# Patient Record
Sex: FEMALE | Race: WHITE | Employment: PART TIME | URBAN - NONMETROPOLITAN AREA
[De-identification: names, ages, dates, MRNs, and addresses within clinical notes are randomized per-mention and may not be internally consistent; named-entity substitution may affect disease eponyms.]

---

## 2018-02-26 ENCOUNTER — HOSPITAL ENCOUNTER (OUTPATIENT)
Age: 58
Discharge: HOME | End: 2018-02-26
Payer: COMMERCIAL

## 2018-02-26 DIAGNOSIS — E03.9 HYPOTHYROIDISM, UNSPECIFIED TYPE: ICD-10-CM

## 2018-02-26 LAB
T3 FREE: 2.52 PG/ML (ref 2.02–4.43)
THYROXINE, FREE: 1.23 NG/DL (ref 0.93–1.7)
TSH SERPL DL<=0.05 MIU/L-ACNC: 7.89 MIU/L (ref 0.3–5)

## 2018-02-26 PROCEDURE — 84443 ASSAY THYROID STIM HORMONE: CPT

## 2018-02-26 PROCEDURE — 84481 FREE ASSAY (FT-3): CPT

## 2018-02-26 PROCEDURE — 84439 ASSAY OF FREE THYROXINE: CPT

## 2018-02-26 PROCEDURE — 36415 COLL VENOUS BLD VENIPUNCTURE: CPT

## 2018-04-11 ENCOUNTER — HOSPITAL ENCOUNTER (OUTPATIENT)
Age: 58
Discharge: HOME | End: 2018-04-11
Payer: COMMERCIAL

## 2018-04-11 DIAGNOSIS — E03.9 HYPOTHYROIDISM, UNSPECIFIED TYPE: Chronic | ICD-10-CM

## 2018-04-11 LAB
T3 FREE: 3.37 PG/ML (ref 2.02–4.43)
THYROXINE, FREE: 1.57 NG/DL (ref 0.93–1.7)
TSH SERPL DL<=0.05 MIU/L-ACNC: 0.46 MIU/L (ref 0.3–5)

## 2018-04-11 PROCEDURE — 84443 ASSAY THYROID STIM HORMONE: CPT

## 2018-04-11 PROCEDURE — 84481 FREE ASSAY (FT-3): CPT

## 2018-04-11 PROCEDURE — 36415 COLL VENOUS BLD VENIPUNCTURE: CPT

## 2018-04-11 PROCEDURE — 84439 ASSAY OF FREE THYROXINE: CPT

## 2018-10-16 ENCOUNTER — HOSPITAL ENCOUNTER (OUTPATIENT)
Age: 58
Discharge: HOME | End: 2018-10-18
Payer: COMMERCIAL

## 2018-10-16 ENCOUNTER — HOSPITAL ENCOUNTER (OUTPATIENT)
Dept: GENERAL RADIOLOGY | Age: 58
Discharge: HOME | End: 2018-10-18
Payer: COMMERCIAL

## 2018-10-16 DIAGNOSIS — R07.81 RIB PAIN ON RIGHT SIDE: ICD-10-CM

## 2018-10-16 PROCEDURE — 71100 X-RAY EXAM RIBS UNI 2 VIEWS: CPT

## 2018-10-24 ENCOUNTER — HOSPITAL ENCOUNTER (OUTPATIENT)
Dept: CT IMAGING | Age: 58
Discharge: HOME | End: 2018-10-26
Payer: COMMERCIAL

## 2018-10-24 DIAGNOSIS — R07.81 RIB PAIN ON RIGHT SIDE: ICD-10-CM

## 2018-10-24 PROCEDURE — 71250 CT THORAX DX C-: CPT

## 2019-10-23 ENCOUNTER — TELEPHONE (OUTPATIENT)
Dept: PAIN MANAGEMENT | Age: 59
End: 2019-10-23

## 2019-11-19 ENCOUNTER — HOSPITAL ENCOUNTER (OUTPATIENT)
Dept: PAIN MANAGEMENT | Age: 59
Discharge: HOME | End: 2019-11-19

## 2019-11-19 VITALS
SYSTOLIC BLOOD PRESSURE: 154 MMHG | BODY MASS INDEX: 38.08 KG/M2 | DIASTOLIC BLOOD PRESSURE: 84 MMHG | WEIGHT: 214.9 LBS | HEIGHT: 63 IN | TEMPERATURE: 97.9 F | HEART RATE: 86 BPM | RESPIRATION RATE: 18 BRPM

## 2019-11-19 DIAGNOSIS — R07.81 RIB PAIN ON RIGHT SIDE: Chronic | ICD-10-CM

## 2019-11-19 DIAGNOSIS — R05.9 PAIN AGGRAVATED BY COUGHING AND DEEP BREATHING: Chronic | ICD-10-CM

## 2019-11-19 DIAGNOSIS — R52 PAIN AGGRAVATED BY COUGHING AND DEEP BREATHING: Chronic | ICD-10-CM

## 2019-11-19 PROCEDURE — 97813 ACUP 1/> W/ESTIM 1ST 15 MIN: CPT | Performed by: ANESTHESIOLOGY

## 2019-11-19 PROCEDURE — 99203 OFFICE O/P NEW LOW 30 MIN: CPT | Performed by: ANESTHESIOLOGY

## 2019-11-19 PROCEDURE — 99201 HC NEW PT, E/M LEVEL 1: CPT

## 2019-11-19 ASSESSMENT — PAIN DESCRIPTION - PAIN TYPE: TYPE: CHRONIC PAIN

## 2019-11-19 ASSESSMENT — PAIN DESCRIPTION - LOCATION: LOCATION: ABDOMEN;RIB CAGE

## 2019-11-19 ASSESSMENT — PAIN DESCRIPTION - PROGRESSION: CLINICAL_PROGRESSION: NOT CHANGED

## 2019-11-19 ASSESSMENT — PAIN DESCRIPTION - ONSET: ONSET: ON-GOING

## 2019-11-19 ASSESSMENT — PAIN DESCRIPTION - ORIENTATION: ORIENTATION: RIGHT

## 2019-11-19 ASSESSMENT — PAIN SCALES - GENERAL: PAINLEVEL_OUTOF10: 2

## 2019-11-19 ASSESSMENT — PAIN - FUNCTIONAL ASSESSMENT: PAIN_FUNCTIONAL_ASSESSMENT: PREVENTS OR INTERFERES SOME ACTIVE ACTIVITIES AND ADLS

## 2019-11-19 ASSESSMENT — PAIN DESCRIPTION - FREQUENCY: FREQUENCY: INTERMITTENT

## 2019-11-19 ASSESSMENT — PAIN DESCRIPTION - DESCRIPTORS: DESCRIPTORS: SPASM;SQUEEZING

## 2019-11-26 ENCOUNTER — HOSPITAL ENCOUNTER (OUTPATIENT)
Dept: PAIN MANAGEMENT | Age: 59
Discharge: HOME | End: 2019-11-26

## 2019-11-26 VITALS
BODY MASS INDEX: 37.32 KG/M2 | DIASTOLIC BLOOD PRESSURE: 83 MMHG | TEMPERATURE: 97.3 F | HEIGHT: 64 IN | RESPIRATION RATE: 20 BRPM | SYSTOLIC BLOOD PRESSURE: 131 MMHG | WEIGHT: 218.6 LBS | HEART RATE: 81 BPM

## 2019-11-26 PROCEDURE — 99212 OFFICE O/P EST SF 10 MIN: CPT | Performed by: ANESTHESIOLOGY

## 2019-11-26 PROCEDURE — 99211 OFF/OP EST MAY X REQ PHY/QHP: CPT

## 2019-11-26 SDOH — HEALTH STABILITY: MENTAL HEALTH: HOW OFTEN DO YOU HAVE A DRINK CONTAINING ALCOHOL?: NEVER

## 2019-11-26 ASSESSMENT — PAIN - FUNCTIONAL ASSESSMENT: PAIN_FUNCTIONAL_ASSESSMENT: ACTIVITIES ARE NOT PREVENTED

## 2019-11-26 ASSESSMENT — PAIN DESCRIPTION - PROGRESSION: CLINICAL_PROGRESSION: GRADUALLY IMPROVING

## 2019-11-26 ASSESSMENT — PAIN DESCRIPTION - ORIENTATION: ORIENTATION: RIGHT

## 2019-11-26 ASSESSMENT — PAIN SCALES - GENERAL: PAINLEVEL_OUTOF10: 0

## 2019-11-26 ASSESSMENT — PAIN DESCRIPTION - PAIN TYPE: TYPE: CHRONIC PAIN

## 2019-11-26 ASSESSMENT — PAIN DESCRIPTION - LOCATION: LOCATION: RIB CAGE

## 2019-11-26 ASSESSMENT — PAIN DESCRIPTION - ONSET: ONSET: PROGRESSIVE

## 2019-11-26 ASSESSMENT — PAIN DESCRIPTION - FREQUENCY: FREQUENCY: INTERMITTENT

## 2020-11-05 ENCOUNTER — HOSPITAL ENCOUNTER (OUTPATIENT)
Age: 60
Discharge: HOME | End: 2020-11-05
Payer: COMMERCIAL

## 2020-11-05 LAB
ALT SERPL-CCNC: 15 U/L (ref 5–33)
ANION GAP SERPL CALCULATED.3IONS-SCNC: 12 MMOL/L (ref 9–17)
AST SERPL-CCNC: 13 U/L
BUN BLDV-MCNC: 19 MG/DL (ref 6–20)
BUN/CREAT BLD: 22 (ref 9–20)
CALCIUM SERPL-MCNC: 9.5 MG/DL (ref 8.6–10.4)
CHLORIDE BLD-SCNC: 102 MMOL/L (ref 98–107)
CHOLESTEROL/HDL RATIO: 4.7
CHOLESTEROL: 230 MG/DL
CO2: 26 MMOL/L (ref 20–31)
CREAT SERPL-MCNC: 0.85 MG/DL (ref 0.5–0.9)
ESTIMATED AVERAGE GLUCOSE: 128 MG/DL
GFR AFRICAN AMERICAN: >60 ML/MIN
GFR NON-AFRICAN AMERICAN: >60 ML/MIN
GFR SERPL CREATININE-BSD FRML MDRD: ABNORMAL ML/MIN/{1.73_M2}
GFR SERPL CREATININE-BSD FRML MDRD: ABNORMAL ML/MIN/{1.73_M2}
GLUCOSE BLD-MCNC: 110 MG/DL (ref 70–99)
HBA1C MFR BLD: 6.1 % (ref 4–6)
HCT VFR BLD CALC: 43.5 % (ref 36.3–47.1)
HDLC SERPL-MCNC: 49 MG/DL
HEMOGLOBIN: 14.1 G/DL (ref 11.9–15.1)
LDL CHOLESTEROL: 140 MG/DL (ref 0–130)
MCH RBC QN AUTO: 29.6 PG (ref 25.2–33.5)
MCHC RBC AUTO-ENTMCNC: 32.4 G/DL (ref 28.4–34.8)
MCV RBC AUTO: 91.2 FL (ref 82.6–102.9)
NRBC AUTOMATED: 0 PER 100 WBC
PDW BLD-RTO: 12.5 % (ref 11.8–14.4)
PLATELET # BLD: 313 K/UL (ref 138–453)
PMV BLD AUTO: 9.4 FL (ref 8.1–13.5)
POTASSIUM SERPL-SCNC: 3.9 MMOL/L (ref 3.7–5.3)
RBC # BLD: 4.77 M/UL (ref 3.95–5.11)
SODIUM BLD-SCNC: 140 MMOL/L (ref 135–144)
TRIGL SERPL-MCNC: 205 MG/DL
VLDLC SERPL CALC-MCNC: ABNORMAL MG/DL (ref 1–30)
WBC # BLD: 7.3 K/UL (ref 3.5–11.3)

## 2020-11-05 PROCEDURE — 84450 TRANSFERASE (AST) (SGOT): CPT

## 2020-11-05 PROCEDURE — 83036 HEMOGLOBIN GLYCOSYLATED A1C: CPT

## 2020-11-05 PROCEDURE — 85027 COMPLETE CBC AUTOMATED: CPT

## 2020-11-05 PROCEDURE — 80061 LIPID PANEL: CPT

## 2020-11-05 PROCEDURE — 80048 BASIC METABOLIC PNL TOTAL CA: CPT

## 2020-11-05 PROCEDURE — 36415 COLL VENOUS BLD VENIPUNCTURE: CPT

## 2020-11-05 PROCEDURE — 84460 ALANINE AMINO (ALT) (SGPT): CPT

## 2021-05-28 ENCOUNTER — HOSPITAL ENCOUNTER (OUTPATIENT)
Dept: WOMENS IMAGING | Age: 61
Discharge: HOME | End: 2021-05-30
Payer: COMMERCIAL

## 2021-05-28 DIAGNOSIS — Z12.31 ENCOUNTER FOR SCREENING MAMMOGRAM FOR BREAST CANCER: ICD-10-CM

## 2021-05-28 PROCEDURE — 77067 SCR MAMMO BI INCL CAD: CPT

## 2021-12-06 ENCOUNTER — HOSPITAL ENCOUNTER (OUTPATIENT)
Age: 61
Discharge: HOME | End: 2021-12-06
Payer: COMMERCIAL

## 2021-12-06 DIAGNOSIS — I10 ESSENTIAL HYPERTENSION, BENIGN: ICD-10-CM

## 2021-12-06 DIAGNOSIS — E03.9 HYPOTHYROIDISM, UNSPECIFIED TYPE: ICD-10-CM

## 2021-12-06 DIAGNOSIS — E78.2 MIXED HYPERLIPIDEMIA: ICD-10-CM

## 2021-12-06 LAB
ANION GAP SERPL CALCULATED.3IONS-SCNC: 13 MMOL/L (ref 9–17)
AST SERPL-CCNC: 17 U/L
BUN BLDV-MCNC: 19 MG/DL (ref 6–20)
BUN/CREAT BLD: 19 (ref 9–20)
CALCIUM SERPL-MCNC: 9.9 MG/DL (ref 8.6–10.4)
CHLORIDE BLD-SCNC: 103 MMOL/L (ref 98–107)
CHOLESTEROL/HDL RATIO: 4.4
CHOLESTEROL: 200 MG/DL
CO2: 26 MMOL/L (ref 20–31)
CREAT SERPL-MCNC: 0.99 MG/DL (ref 0.5–0.9)
GFR AFRICAN AMERICAN: >60 ML/MIN
GFR NON-AFRICAN AMERICAN: 57 ML/MIN
GFR SERPL CREATININE-BSD FRML MDRD: ABNORMAL ML/MIN/{1.73_M2}
GFR SERPL CREATININE-BSD FRML MDRD: ABNORMAL ML/MIN/{1.73_M2}
GLUCOSE BLD-MCNC: 100 MG/DL (ref 70–99)
HCT VFR BLD CALC: 45 % (ref 36.3–47.1)
HDLC SERPL-MCNC: 45 MG/DL
HEMOGLOBIN: 14.9 G/DL (ref 11.9–15.1)
LDL CHOLESTEROL: 118 MG/DL (ref 0–130)
MCH RBC QN AUTO: 29 PG (ref 25.2–33.5)
MCHC RBC AUTO-ENTMCNC: 33.1 G/DL (ref 28.4–34.8)
MCV RBC AUTO: 87.7 FL (ref 82.6–102.9)
NRBC AUTOMATED: 0 PER 100 WBC
PDW BLD-RTO: 12.1 % (ref 11.8–14.4)
PLATELET # BLD: 275 K/UL (ref 138–453)
PMV BLD AUTO: 9.2 FL (ref 8.1–13.5)
POTASSIUM SERPL-SCNC: 3.6 MMOL/L (ref 3.7–5.3)
RBC # BLD: 5.13 M/UL (ref 3.95–5.11)
SODIUM BLD-SCNC: 142 MMOL/L (ref 135–144)
T3 FREE: 3.72 PG/ML (ref 2.02–4.43)
THYROXINE, FREE: 2.18 NG/DL (ref 0.93–1.7)
TRIGL SERPL-MCNC: 186 MG/DL
TSH SERPL DL<=0.05 MIU/L-ACNC: 0.05 MIU/L (ref 0.3–5)
VLDLC SERPL CALC-MCNC: ABNORMAL MG/DL (ref 1–30)
WBC # BLD: 6.2 K/UL (ref 3.5–11.3)

## 2021-12-06 PROCEDURE — 84443 ASSAY THYROID STIM HORMONE: CPT

## 2021-12-06 PROCEDURE — 80048 BASIC METABOLIC PNL TOTAL CA: CPT

## 2021-12-06 PROCEDURE — 84481 FREE ASSAY (FT-3): CPT

## 2021-12-06 PROCEDURE — 85027 COMPLETE CBC AUTOMATED: CPT

## 2021-12-06 PROCEDURE — 84450 TRANSFERASE (AST) (SGOT): CPT

## 2021-12-06 PROCEDURE — 36415 COLL VENOUS BLD VENIPUNCTURE: CPT

## 2021-12-06 PROCEDURE — 80061 LIPID PANEL: CPT

## 2021-12-06 PROCEDURE — 84439 ASSAY OF FREE THYROXINE: CPT

## 2021-12-10 ENCOUNTER — TELEPHONE (OUTPATIENT)
Dept: ONCOLOGY | Age: 61
End: 2021-12-10

## 2021-12-10 NOTE — TELEPHONE ENCOUNTER
Order received for CT lung screening. EMR and order reviewed. Information regarding ct lung screening and smoking cessation referral mailed to patient.

## 2022-02-11 ENCOUNTER — HOSPITAL ENCOUNTER (OUTPATIENT)
Dept: CT IMAGING | Age: 62
Discharge: HOME | End: 2022-02-13
Payer: COMMERCIAL

## 2022-02-11 DIAGNOSIS — Z87.891 PERSONAL HISTORY OF TOBACCO USE: ICD-10-CM

## 2022-02-11 PROCEDURE — 71271 CT THORAX LUNG CANCER SCR C-: CPT

## 2022-02-14 ENCOUNTER — TELEPHONE (OUTPATIENT)
Dept: CASE MANAGEMENT | Age: 62
End: 2022-02-14

## 2022-02-14 DIAGNOSIS — R91.1 LUNG NODULE: Primary | ICD-10-CM

## 2022-02-14 DIAGNOSIS — R91.8 ABNORMAL CT SCAN OF LUNG: ICD-10-CM

## 2022-02-14 NOTE — TELEPHONE ENCOUNTER
Lung Navigator reviewing chart and recent Lung Screening with new nodule. Writer will forward message to provider for review. Navigator available if needed.

## 2022-02-15 ENCOUNTER — HOSPITAL ENCOUNTER (OUTPATIENT)
Age: 62
Discharge: HOME | End: 2022-02-15
Payer: COMMERCIAL

## 2022-02-15 DIAGNOSIS — E03.9 HYPOTHYROIDISM, UNSPECIFIED TYPE: ICD-10-CM

## 2022-02-15 LAB — TSH SERPL DL<=0.05 MIU/L-ACNC: 0.04 MIU/L (ref 0.3–5)

## 2022-02-15 PROCEDURE — 84443 ASSAY THYROID STIM HORMONE: CPT

## 2022-02-15 PROCEDURE — 84439 ASSAY OF FREE THYROXINE: CPT

## 2022-02-15 PROCEDURE — 36415 COLL VENOUS BLD VENIPUNCTURE: CPT

## 2022-02-15 PROCEDURE — 84481 FREE ASSAY (FT-3): CPT

## 2022-02-15 NOTE — TELEPHONE ENCOUNTER
Impression   Right lower lobe subpleural nodule 14 x 8.8 mm. No mediastinal adenopathy. LUNG RADS:   Per ACR Lung-RADS Version 1.1       Category 4 a. nodule less than 15 mm at baseline. Advise 3 month follow-up   low-dose CT scan. PET-CT scan may be utilized as the lesion is more than 8   mm.        Laura Muller - please refer to Pulmonology clinic for further workup / follow up of this pulmonary nodule

## 2022-02-16 LAB
T3 FREE: 3.42 PG/ML (ref 2.02–4.43)
THYROXINE, FREE: 1.74 NG/DL (ref 0.93–1.7)

## 2022-02-23 ENCOUNTER — TELEPHONE (OUTPATIENT)
Dept: CASE MANAGEMENT | Age: 62
End: 2022-02-23

## 2022-02-23 DIAGNOSIS — R91.1 LUNG NODULE SEEN ON IMAGING STUDY: Primary | ICD-10-CM

## 2022-02-23 NOTE — TELEPHONE ENCOUNTER
Lung Navigator spoke with pulmonary office and await appt. With Dr. Alicia Monk office working on appt. . Writer sending staff message to Dr. Sofiya Charlton for review of Scan in the mean time.

## 2022-02-24 ENCOUNTER — TELEPHONE (OUTPATIENT)
Dept: CASE MANAGEMENT | Age: 62
End: 2022-02-24

## 2022-02-24 NOTE — TELEPHONE ENCOUNTER
I spoke with Shelley Barnes and she will reach out to patient to make she is aware of PET ordered prior to me scheduling if not already scheduled.

## 2022-02-24 NOTE — TELEPHONE ENCOUNTER
Lung Navigator reviewing chart and Lung Screening reviewed per Dr. Lloyd Situ and order placed for PET. Cassandra Reece , can you assist with scheduling of PET for Pt.  Pt. Will more than likely want completed in [place]

## 2022-02-28 ENCOUNTER — OFFICE VISIT (OUTPATIENT)
Dept: PULMONOLOGY | Age: 62
End: 2022-02-28
Payer: COMMERCIAL

## 2022-02-28 VITALS
OXYGEN SATURATION: 98 % | SYSTOLIC BLOOD PRESSURE: 134 MMHG | TEMPERATURE: 97.5 F | BODY MASS INDEX: 35.22 KG/M2 | DIASTOLIC BLOOD PRESSURE: 70 MMHG | HEIGHT: 64 IN | HEART RATE: 92 BPM | WEIGHT: 206.3 LBS | RESPIRATION RATE: 16 BRPM

## 2022-02-28 DIAGNOSIS — R91.1 RIGHT LOWER LOBE PULMONARY NODULE: Primary | ICD-10-CM

## 2022-02-28 PROCEDURE — 99204 OFFICE O/P NEW MOD 45 MIN: CPT | Performed by: INTERNAL MEDICINE

## 2022-02-28 NOTE — PATIENT INSTRUCTIONS
SURVEY:    You may be receiving a survey from [employer] regarding your visit today. Please complete the survey to enable us to provide the highest quality of care to you and your family. If you cannot score us a very good on any question, please call the office to discuss how we could have made your experience a very good one. Thank you.

## 2022-02-28 NOTE — PROGRESS NOTES
PULMONARY MEDICINE OUTPATIENT  CONSULT NOTE                                                                       PATIENT:  Seble Aparicio  YOB: 1960  MRN: [id number]     REFERRED BY: Darryl Gilmore MD   CHIEF COMPLIANT: Abnormal CT (New patient ref. for evaluation and tx. . incidental find. ..denies any resp. problems)       HISTORY     HISTORY OF PRESENT ILLNESS:   Seble Aparicio is a 62y.o. year old female here for establishing care    Right lower lobe pulmonary nodule/tobacco abuse:  Patient was chronic smoker. Smokes about 1 pack/day. Has 40-pack-year smoking history. She was recently found out to have a 14 x 8 mm pleural-based nodule in the right lower lobe on CT chest.  Previous CT scan from October 2018 was within normal limits. Recommended CT-guided biopsy. She will also need PET scan for further evaluation. No PFT available. Currently asymptomatic. PAST MEDICAL HISTORY:      Diagnosis Date    Arthritis     Hyperlipidemia     Hypertension     Hypothyroidism     Impaired fasting glucose      PAST SURGICAL HISTORY:      Procedure Laterality Date    EYE SURGERY Bilateral 2007    three wall decompression    OTHER SURGICAL HISTORY  2007    radioactive iodine abalation of thyroid    TUBAL LIGATION  1990    WISDOM TOOTH EXTRACTION  13years old     SOCIAL HISTORY:  TOBACCO:   reports that she has been smoking cigarettes. She has a 40.00 pack-year smoking history. She has never used smokeless tobacco.  ETOH:   reports no history of alcohol use. DRUGS: reports no history of drug use.      AVOCATION/OCCUPATIONAL EXPOSURE:  [] Asbestos      [] Hot tub  [] Silica dust    [] Pets  [] Coal            [] Exotic birds  [] [employer]       [] Tuberculosis  [] Ekaterina Sham         [] Others  [] [place]          PHYSICAL EXAMINATION      VITAL SIGNS:   /70   Pulse 92   Temp 97.5 °F (36.4 °C)   Resp 16   Ht 5' 3.5\" (1.613 m)   Wt 206 lb 4.8 oz (93.6 kg)   SpO2 98%   BMI 35.97 kg/m²   Wt Readings from Last 3 Encounters:   03/28/22 206 lb 14.4 oz (93.8 kg)   02/28/22 206 lb 4.8 oz (93.6 kg)   01/21/22 206 lb 3.2 oz (93.5 kg)     SYSTEMIC EXAMINATION:   General appearance -  [x] well appearing  [] overweight  [x] obese [] cachectic [x] comfortable  [x] in no acute distress  [] chronically ill-appearing  [] in mild to moderate respiratory distress   Mental status -  [x] alert  [x] oriented to person, place, and time  [] anxious  [] depressed mood    Head-  [x] atraumatic  [x] normocephalic   Eyes -  [] pupils equal and reactive, extraocular eye movements intact  [] sclera anicteric   Ears -  [x] hearing grossly normal bilaterally [] bilateral TM's and external ear canals normal   Nose -  [x] normal and patent [] no erythema  [] discharge   Mouth -  [x] mucous membranes moist  [] pharynx normal without lesions [] erythematous  [] exudate noted   Mallampati Airway Score -  [] I (soft palate, uvula, fauces, tonsillar pillars visible) [] II (soft palate, uvula, fauces visible) []  III (soft palate, base of uvula visible) [] IV (only hard palate visible)   Neck -  [] supple  [] no significant adenopathy  [] carotids upstroke normal bilaterally [] no JVD  [] no bruit   Lymphatics -  [x] no palpable lymphadenopathy  [] no hepatosplenomegaly   Chest -   [x] normal chest excursion  [x] no chest wall tenderness  [] increased AP diameter[] pectus noted [] scoliosis noted [x] no tachypnea retraction or cyanosis [x] clear to auscultation, no wheezes, rales or rhonchi, symmetric air entry  [] wheezing noted  [] rales noted  []rhonchi noted [] decreased air entry noted bilaterally   Heart -  [x] normal rate,  [x] regular rhythm  [] irregularly irregular rhythm [x] normal S1  [x] normal S2  [x] no murmurs, rubs, clicks or gallops  [] S3 present  [] S4 present  [] systolic murmur  [] diastolic murmur  [] midsystolic click []pericardial rub present    Abdomen -  [] soft [] nontender  [] nondistended  [] no masses or organomegaly   Neurological -  [x] normal speech  [x] no focal findings or movement disorder noted  [] cranial nerves II through XII intact  [] DTR's normal and symmetric  [] Babinski sign negative,  [x] motor and sensory grossly normal bilaterally  [] normal muscle tone [x] no tremors  [] strength 5/5  [] Romberg sign negative [] normal gait    Musculoskeletal -  [x] no joint tenderness [x] no deformity or swelling   Extremities - [x] no clubbing or cyanosis,  [x] no pedal edema [] pedal edema  [] intact peripheral pulses   Skin -  [x] normal coloration and turgor  [x] no rashes  [] no suspicious skin lesions noted          MEDICAL DECISION MAKING     1. PROBLEMS ADDRESSED (NUMBER AND COMPLEXITY)       ICD-10-CM    1. Right lower lobe pulmonary nodule  R91.1 CT NEEDLE BIOPSY LUNG PERCUTANEOUS     Full PFT Study With Bronchodilator        2.  DATA REVIEWED AND ANALYZED (AMOUNT AND/OR COMPLEXITY)   LABS:  ABG:   No results found for: PH, PHART, PCO2, JUH6XFQ, PO2, PO2ART, HCO3, FXP4WKX, BE, BEART, THGB, [id number]  VBG:  No results found for: PHVEN, GTQ3SGW, BEVEN, E6VLVRBX  CBC:   Lab Results   Component Value Date    WBC 6.2 12/06/2021    RBC 5.13 (H) 12/06/2021    HGB 14.9 12/06/2021    HCT 45.0 12/06/2021     12/06/2021    MCV 87.7 12/06/2021    MCH 29.0 12/06/2021    MCHC 33.1 12/06/2021    RDW 12.1 12/06/2021    LYMPHOPCT 32 09/08/2016    MONOPCT 6 09/08/2016    BASOPCT 1 09/08/2016    MONOSABS 0.50 09/08/2016    LYMPHSABS 2.80 09/08/2016    EOSABS 0.40 09/08/2016    BASOSABS 0.10 09/08/2016    DIFFTYPE NOT REPORTED 09/08/2016     Allergen Panel:No results found for: REGVALL  Eosinophils/IgE:   Lab Results   Component Value Date    EOSABS 0.40 09/08/2016     Alpha 1 antitrypsin: No results found for: A1A  CMP:   Lab Results   Component Value Date     12/06/2021    K 3.6 (L) 12/06/2021     12/06/2021    CO2 26 12/06/2021    BUN 19 12/06/2021    CREATININE 0.99 (H) 12/06/2021    GLUCOSE 100 (H) 12/06/2021    CALCIUM 9.9 12/06/2021    PROT 7.4 09/08/2016    LABALBU 4.2 09/08/2016    BILITOT 0.23 (L) 09/08/2016    ALT 15 11/05/2020    AST 17 12/06/2021    ALKPHOS 75 09/08/2016    AMYLASE 71 09/08/2016    LIPASE 32 09/08/2016     Coagulation Profile:   No results found for: INR, PROTIME, APTT  BNP:   No results found for: BNP, PROBNP  D-Dimer/Fibrinogen:  No results found for: DDIMER  Others labs:  Lab Results   Component Value Date    TSH 0.04 (L) 02/15/2022    FT3 3.42 02/15/2022        No results found for: YOANA, ANATITER, RHEUMFACTOR, RF, C3, C4, MPO, PR3, SEDRATE, CRP  No results found for: IRON, TIBC, FERRITIN, FOLATE, LDH  No results found for: SPEP, UPEP, PSA, CEA, , OC9173,   No results found for: RPR, HIV    RADIOLOGY:  [] Ordered  [] Unavailable  [x] Reviewed    Chest x-ray:  CT chest: 2/11/2022    Impression   Right lower lobe subpleural nodule 14 x 8.8 mm.  No mediastinal adenopathy.       LUNG RADS:   Per ACR Lung-RADS Version 1.1       Category 4 a. nodule less than 15 mm at baseline.  Advise 3 month follow-up   low-dose CT scan.  PET-CT scan may be utilized as the lesion is more than 8   mm. PET scan:    ECHOCARDIOGRAM:  No results found for this or any previous visit. PULMONARY FUNCTION TEST:  [x] Ordered  [x] Unavailable  [] Reviewed  No results found for: FEV1, FVC, QKX6HFR, TLC, DLCO   No results found for this or any previous visit. 6 MINUTE WALK TEST:  [] Ordered  [x] Unavailable  [] Reviewed  No results found for this or any previous visit. Distance Walk Predicted Distance LLN** Predicted % Duration (min) Duration of Stops Sec Judson Dyspnea Judson Fatigue               ASSESSMENT OF EXCESSIVE DAYTIME SLEEPINESS (BY INDEPENDENT HISTORIAN):  [place] Sleepiness Scale:  [x] Not obtained  [] Obtained  No flowsheet data found.      PRIOR EXTERNAL NOTES:  [] Unavailable  [x] Reviewed    ORDERS PLACED:  Orders Placed This Encounter Procedures    CT NEEDLE BIOPSY LUNG PERCUTANEOUS     Standing Status:   Future     Standing Expiration Date:   2/28/2023     Order Specific Question:   Reason for exam:     Answer:   RLL pulm nodule    Full PFT Study With Bronchodilator     If an ABG is needed along with this PFT procedure, please place the appropriate lab order     Standing Status:   Future     Number of Occurrences:   1     Standing Expiration Date:   2/28/2023        3. RISK OF COMPLICATIONS AND/OR MORBIDITY OR MORTALITY:     ALLERGIES:  No Known Allergies   MEDICATIONS:  Outpatient Medications Prior to Visit   Medication Sig Dispense Refill    levothyroxine (SYNTHROID) 137 MCG tablet Take 1 tablet by mouth Daily 90 tablet 3    meloxicam (MOBIC) 15 MG tablet Take 1 tablet by mouth daily as needed for Pain 30 tablet 6    lisinopril-hydroCHLOROthiazide (PRINZIDE;ZESTORETIC) 20-25 MG per tablet Take 1 tablet by mouth daily 90 tablet 3     No facility-administered medications prior to visit. PRESCRIPTION DRUG MANAGEMENT/RECOMMENDATIONS:  Srivastava Ordered PFT   Reviewed CT chest, discussed at length with patient   Recommended CT-guided biopsy   Also recommended PET scan to look for any extrapulmonary spread    SUPPLEMENTAL OXYGEN/NIV RECOMMENDATIONS:   Patient is not on home oxygen therapy. SMOKING CESSATION RECOMMENDATIONS/COUNSELING:   Patient admonished to quit smoking    LIFESTYLE MODIFICATION RECOMMENDATIONS/COUNSELING:   Follow healthy behaviors: nutrition, exercise and medication adherence.  Maintain an active lifestyle. LUNG CANCER SCREENING/OTHER IMAGING RECOMMENDATIONS:   After reviewing the patient's smoking history, age and other clinical criteria, the LOW DOSE CT is : NOT INDICATED;  Recent CT within 11 months     IMMUNIZATION HISTORY/RECOMMENDATIONS:  Immunization History   Administered Date(s) Administered    COVID-19, Tiney File, Primary or Immunocompromised, PF, 100mcg/0.5mL 03/31/2021, 04/30/2021    Influenza Vaccine, unspecified formulation 11/01/2015    Influenza Virus Vaccine 10/16/2014, 10/15/2017, 10/20/2018    Influenza, Triv, 3 Years and older, IM (Gladys Minor (5 yrs and older) 11/25/2016    Pneumococcal Polysaccharide ([id number]) 11/25/2016    Tdap (Boostrix, Adacel) 10/15/2017     All the questions that the patient had were answered to her satisfaction  We'll see the patient back in one month  Thank you for having us involved in the care of your patient. Please call us if you have any questions or concerns. Lj Davey MD  Pulmonary and Critical Care Medicine            Please note that this chart was generated using voice recognition Dragon dictation software. Although every effort was made to ensure the accuracy of this automated transcription, some errors in transcription may have occurred.

## 2022-03-03 ENCOUNTER — TELEPHONE (OUTPATIENT)
Dept: PULMONOLOGY | Age: 62
End: 2022-03-03

## 2022-03-03 NOTE — TELEPHONE ENCOUNTER
Latonia Massey in IR called and indicated the IR physician stated the nodules noted are to small to be bx. And to f/u in 6m with another ct of the chest .  Please advise change in care plan.

## 2022-03-09 ENCOUNTER — HOSPITAL ENCOUNTER (OUTPATIENT)
Dept: PET IMAGING | Age: 62
Discharge: HOME | End: 2022-03-11
Payer: COMMERCIAL

## 2022-03-09 DIAGNOSIS — R91.1 LUNG NODULE SEEN ON IMAGING STUDY: ICD-10-CM

## 2022-03-09 PROCEDURE — A9552 F18 FDG: HCPCS | Performed by: INTERNAL MEDICINE

## 2022-03-09 PROCEDURE — 78815 PET IMAGE W/CT SKULL-THIGH: CPT

## 2022-03-09 PROCEDURE — 3430000000 HC RX DIAGNOSTIC RADIOPHARMACEUTICAL: Performed by: INTERNAL MEDICINE

## 2022-03-09 RX ORDER — FLUDEOXYGLUCOSE F 18 200 MCI/ML
14.51 INJECTION, SOLUTION INTRAVENOUS
Status: COMPLETED | OUTPATIENT
Start: 2022-03-09 | End: 2022-03-09

## 2022-03-09 RX ADMIN — FLUDEOXYGLUCOSE F 18 14.51 MILLICURIE: 200 INJECTION, SOLUTION INTRAVENOUS at 13:05

## 2022-03-13 NOTE — PROGRESS NOTES
Subjective:      Patient ID: Nikolai Hua is a 62 y.o. female. HPI  Patient here for follow-up for pulmonary nodule. Patient was last seen in the office in February 2022 per Dr. Hayley Kenny. Patient unfortunately still smoking, smoking 1 pack/day. She reports that she does roll her own cigarettes, uses a filter. Previously tried smoking cessation with Chantix however was not successful. Not motivated to quit. Reviewed PET scan with patient. We will repeat CT scan in 6 months. Medications:   No pulmonary meds    PRIOR WORKUP:  PFT:  PFT 3/23/22: FVC 2.94, 92% of predicted, postbronchodilator 2.77.  FEV1 2.17 (88% of predicted, postbronchodilator 2.19), FEV1/FVC ratio 74 (94% of predicted, postbronchodilator 79), FEF 25/75 1.48 (65% of predicted, postbronchodilator 1.62), RV 1.34 (69% of predicted), TLC 4.87 (99% of predicted), ERV 0.23 (27% of predicted), DLCO 20.69 (90% of predicted) when corrected for alveolar volume is 4.71 (100% of predicted. Final impression is pending physician interpretation. CT Imaging:  PET scan 3/9/2022: Normal physiological uptake of FDG noted in great vessels and left ventricular myocardium. No abnormal FDG avid lesions are noted in the mediastinum. Nodule previously noted in the right posterior costophrenic gutter is not FDG avid with an SUV max of 1.7. CT lung screening 2/11/2022: No mediastinal adenopathy. Mild emphysematous changes are present in the lungs. Patient has a right lower lobe subpleural ovoid nodule measuring 14 x 8.8 mm. No other noncalcified solid nodules are present in the lungs. No effusion or extrapleural air. CT chest 10/24/2018: No mediastinal adenopathy. No focal consolidation or pleural effusion. No pulmonary edema or pneumothorax. Focal paraseptal emphysematous changes in the peripheral right upper lobe.     Sleep Study:    Laboratory Evaluation:    Immunizations:   Immunization History   Administered Date(s) Administered   Sruthi Aguiar Cat Rede, Primary or Immunocompromised, PF, 100mcg/0.5mL 03/31/2021, 04/30/2021    Influenza Vaccine, unspecified formulation 11/01/2015    Influenza Virus Vaccine 10/16/2014, 10/15/2017, 10/20/2018    Influenza, Triv, 3 Years and older, IM (Afluria (5 yrs and older) 11/25/2016    Pneumococcal Polysaccharide ([id number]) 11/25/2016    Tdap (Boostrix, Adacel) 10/15/2017        No flowsheet data found.     /73   Pulse 72   Temp 96.5 °F (35.8 °C)   Resp 16   Ht 5' 3\" (1.6 m)   Wt 206 lb 14.4 oz (93.8 kg)   SpO2 99%   BMI 36.65 kg/m²     Past Medical History:   Diagnosis Date    Arthritis     Hyperlipidemia     Hypertension     Hypothyroidism     Impaired fasting glucose      Past Surgical History:   Procedure Laterality Date    EYE SURGERY Bilateral 2007    three wall decompression    OTHER SURGICAL HISTORY  2007    radioactive iodine abalation of thyroid    TUBAL LIGATION  1990    WISDOM TOOTH EXTRACTION  13years old     Family History   Problem Relation Age of Onset    High Cholesterol Mother     Hypertension Mother     Diabetes Mother     Osteoarthritis Mother     Thyroid Disease Mother     High Cholesterol Father     Hypertension Father     Diabetes Father     Cancer Father         nonhodgkins lymphoma    Osteoarthritis Father     Thyroid Disease Father        Social History     Socioeconomic History    Marital status:      Spouse name: Not on file    Number of children: 2    Years of education: Not on file    Highest education level: Not on file   Occupational History    Not on file   Tobacco Use    Smoking status: Current Every Day Smoker     Packs/day: 1.00     Years: 40.00     Pack years: 40.00     Types: Cigarettes    Smokeless tobacco: Never Used   Vaping Use    Vaping Use: Never used   Substance and Sexual Activity    Alcohol use: Never    Drug use: No    Sexual activity: Never   Other Topics Concern    Not on file   Social History Narrative  Not on file     Social Determinants of Health     Financial Resource Strain: Low Risk     Difficulty of Paying Living Expenses: Not hard at all   Food Insecurity: No Food Insecurity    Worried About Running Out of Food in the Last Year: Never true    [address] in the Last Year: Never true   Transportation Needs:     Lack of Transportation (Medical): Not on file    Lack of Transportation (Non-Medical): Not on file   Physical Activity:     Days of Exercise per Week: Not on file    Minutes of Exercise per Session: Not on file   Stress:     Feeling of Stress : Not on file   Social Connections:     Frequency of Communication with Friends and Family: Not on file    Frequency of Social Gatherings with Friends and Family: Not on file    Attends Yazidi Services: Not on file    Active Member of [address] [place] [address] or Organizations: Not on file    Attends Club or Organization Meetings: Not on file    Marital Status: Not on file   Intimate Partner Violence:     Fear of Current or Ex-Partner: Not on file    Emotionally Abused: Not on file    Physically Abused: Not on file    Sexually Abused: Not on file   Housing Stability:     Unable to Pay for Housing in the Last Year: Not on file    Number of [place] in the Last Year: Not on file    Unstable Housing in the Last Year: Not on file       Review of Systems   Constitutional: Negative for fatigue and fever. HENT: Negative for postnasal drip, rhinorrhea, sinus pressure and sinus pain. Respiratory: Negative for cough, chest tightness, shortness of breath, wheezing and stridor. Cardiovascular: Negative for chest pain and leg swelling. Gastrointestinal: Negative for constipation, diarrhea, nausea and vomiting. Genitourinary: Negative for dysuria, frequency and urgency. Musculoskeletal: Negative for arthralgias, joint swelling and myalgias. Skin: Negative for rash. Neurological: Negative for dizziness, speech difficulty and headaches. Hematological: Does not bruise/bleed easily. Psychiatric/Behavioral: Negative for agitation, hallucinations, sleep disturbance and suicidal ideas. Objective:       Physical Exam  General appearance - alert, well appearing, and in no distress, oriented to person, place, and time and overweight  Mental status - alert, oriented to person, place, and time, normal mood, behavior, speech, dress, motor activity, and thought processes  Eyes - pupils equal and reactive, extraocular eye movements intact  Ears - not examined  Nose - normal and patent, no erythema, discharge or polyps  Mouth - mucous membranes moist, pharynx normal without lesions  Neck - supple, no significant adenopathy  Chest - clear to auscultation, no wheezes, rales or rhonchi, symmetric air entry  Heart -normal rate, regular rhythm, normal S1, S2, no murmurs, rubs, clicks or gallops  Abdomen - soft, nontender, nondistended, no masses or organomegaly  Neuro- alert, oriented, normal speech, no focal findings or movement disorder noted}  Extremities - no pedal edema noted, intact peripheral pulses  Skin - normal coloration and turgor, no rashes, no suspicious skin lesions noted     Wt Readings from Last 3 Encounters:   03/28/22 206 lb 14.4 oz (93.8 kg)   02/28/22 206 lb 4.8 oz (93.6 kg)   01/21/22 206 lb 3.2 oz (93.5 kg)       Results for orders placed or performed during the hospital encounter of 02/15/22   TSH without Reflex   Result Value Ref Range    TSH 0.04 (L) 0.30 - 5.00 mIU/L   T4, Free   Result Value Ref Range    Thyroxine, Free 1.74 (H) 0.93 - 1.70 ng/dL   T3, Free   Result Value Ref Range    T3, Free 3.42 2.02 - 4.43 pg/mL       PET CT SKULL BASE TO MID THIGH    Result Date: 3/9/2022  EXAMINATION: WHOLE BODY PET/CT 3/9/2022 TECHNIQUE: Following IV injection of 14.51 mCi of F-18 FDG, PET  tumor imaging was acquired from the base of the skull to the mid thighs.   Computed tomography was used for purposes of attenuation correction and anatomic localization. Fusion imaging was utilized for interpretation. Uptake time 57 min. Glucose level 97 mg/dl. COMPARISON: CT chest of February 11, 2022 HISTORY: ORDERING SYSTEM PROVIDED HISTORY: Lung nodule seen on imaging study TECHNOLOGIST PROVIDED HISTORY: screening LDCT with RLL 15mm nodule and high risk factory profile. Nodule in the right posterior costophrenic gutter on recent CT. FINDINGS: HEAD/NECK: Normal physiologic uptake of FDG is noted in the brain parenchyma and salivary glands. No abnormal hypermetabolic foci are noted within the cervical lymph node chain. CHEST: Normal physiologic uptake of FDG is noted in the great vessels and left ventricular myocardium. No abnormal FDG avid lesions are noted in the mediastinum. Nodule on CT in the right posterior costophrenic gutter is not FDG avid, SUV maximum of 1.7. No abnormal FDG uptake in the lung fields. ABDOMEN/PELVIS: Normal physiologic uptake of FDG is noted in the liver, spleen, and urinary collecting systems to include kidneys, ureters, and bladder. No abnormal bowel uptake is present. Uptake in the rectum is noted relatively uniform which may be related to fecal stasis. There is also uptake within the urethra. Findings may be related to pelvic relaxation or cystocele. No abnormal radiotracer uptake in the groin areas noted. BONES/SOFT TISSUE: No abnormal FDG uptake is noted of concern in the skeletal structures. No abnormal subcutaneous soft tissue uptake. INCIDENTAL CT FINDINGS: Calcification in the left thyroid lobe is noted. Moderate calcification of the aortic arch. Mild to moderate calcified plaque in the distal abdominal aorta and iliac arteries. Multilevel degenerative changes in the spine. No abnormal FDG uptake of concern suggesting neoplasia in the neck, chest, abdomen or pelvis. Specifically, nodule in the right posterior costophrenic gutter is not FDG avid.   Uptake in the perineum suggests cystocele or pelvic relaxation. RECOMMENDATIONS: Continue annual low-dose CT screening of the chest in this current smoker with 40 pack-year history. CT Lung Screen (Annual)    Result Date: 2022  EXAMINATION: LOW DOSE SCREENING CT OF THE CHEST WITHOUT CONTRAST 2022 4:54 pm TECHNIQUE: Low dose lung cancer screening CT of the chest was performed without the administration of intravenous contrast.  Multiplanar reformatted images are provided for review. Dose modulation, iterative reconstruction, and/or weight based adjustment of the mA/kV was utilized to reduce the radiation dose to as low as reasonably achievable. COMPARISON: 2018 HISTORY: Screening. Patient Age: 63 y/o Number of pack years of smokin If no longer smoking, number of years since cessation:  Everyday smoker Other symptoms:  None. Additional history: ORDERING SYSTEM PROVIDED HISTORY: Personal history of tobacco use TECHNOLOGIST PROVIDED HISTORY: Age: Patient is 62 y.o. Smoking History: Social History Tobacco Use Smoking status: Current Every Day Smoker Packs/day: 1.00 Years: 40.00 Pack years: 40 Types: Cigarettes Smokeless tobacco: Never Used Vaping Use Vaping Use: Never used Alcohol use: Never Drug use: No Pack years: 40 Date of last lung cancer screening: No previous lung cancer screening exam Is there documentation of shared decision making? ->Yes Is this a low dose CT or a routine CT?->Low Dose CT Is this the first (baseline) CT or an annual exam?->Baseline Does the patient show any signs or symptoms of lung cancer? ->No Smoking Status?->Current Every Day Smoker Smoking packs per day?->1 Years smoking?->40 FINDINGS: Mediastinum:  No mediastinal adenopathy, acute aortic abnormality, cardiomegaly, pericardial effusion or epicardial adenopathy is noted. Moderate calcified plaque of the aortic arch is noted. Coronary artery calcification is demonstrated. Lungs/Pleura:  Mild emphysematous changes are present in the lungs.   The patient has a right lower lobe subpleural ovoid nodule 14 by 8.8 mm. No other noncalcified solid nodules are present in the lungs. No effusion or extrapleural air. Tracheobronchial tree is patent. Upper Abdomen:  Atherosclerotic calcification of the aorta is noted. No acute abnormality in the included upper abdominal structures. Soft Tissues/Bones: Multilevel degenerative changes are present in spine without acute osseous abnormality. No axillary adenopathy. Right lower lobe subpleural nodule 14 x 8.8 mm. No mediastinal adenopathy. LUNG RADS: Per ACR Lung-RADS Version 1.1 Category 4 a. nodule less than 15 mm at baseline. Advise 3 month follow-up low-dose CT scan. PET-CT scan may be utilized as the lesion is more than 8 mm. RECOMMENDATIONS: If you would like to register your patient with the [facility] Nodule/Lung Cancer Screening Program, please contact the Nurse Navigator at [phone]). Unavailable       Assessment:      1. Nodule of lower lobe of right lung    2. Smoking addiction    3. Chronic obstructive pulmonary disease, unspecified COPD type ([facility] [address]          Plan:      1. Patient is on no pulmonary medications. 2. Educated and clarified the medication use. 3. Recommend flu vaccination in the fall annually. 4. Patient is up-to-date with pneumococcal vaccinations from pulmonary perspective. 5. Patient has COVID vaccine. Recommended COVID booster. . Discussed strategies to protect against Covid 19.   6. Maintain an active lifestyle. 7. Patient's questions were answered to her satisfaction. 8. Strongly admonished smoking cessation, patient not motivated to quit. 9. Pulmonary function tests were reviewed  10. CT scan of the chest was reviewed  11.  We'll see the patient back in 6 months          Electronically signed by PATRICIA Zamora CNP on 3/28/2022 at 11:25 AM

## 2022-03-23 ENCOUNTER — HOSPITAL ENCOUNTER (OUTPATIENT)
Dept: PULMONOLOGY | Age: 62
Discharge: HOME | End: 2022-03-23
Payer: COMMERCIAL

## 2022-03-23 VITALS — RESPIRATION RATE: 18 BRPM | OXYGEN SATURATION: 98 %

## 2022-03-23 DIAGNOSIS — R91.1 RIGHT LOWER LOBE PULMONARY NODULE: ICD-10-CM

## 2022-03-23 PROCEDURE — 6370000000 HC RX 637 (ALT 250 FOR IP): Performed by: INTERNAL MEDICINE

## 2022-03-23 PROCEDURE — 94060 EVALUATION OF WHEEZING: CPT

## 2022-03-23 PROCEDURE — 94729 DIFFUSING CAPACITY: CPT

## 2022-03-23 PROCEDURE — 94664 DEMO&/EVAL PT USE INHALER: CPT

## 2022-03-23 PROCEDURE — 94726 PLETHYSMOGRAPHY LUNG VOLUMES: CPT

## 2022-03-23 RX ORDER — ALBUTEROL SULFATE 90 UG/1
4 AEROSOL, METERED RESPIRATORY (INHALATION) ONCE
Status: COMPLETED | OUTPATIENT
Start: 2022-03-23 | End: 2022-03-23

## 2022-03-23 RX ADMIN — ALBUTEROL SULFATE 4 PUFF: 90 AEROSOL, METERED RESPIRATORY (INHALATION) at 08:29

## 2022-03-28 ENCOUNTER — OFFICE VISIT (OUTPATIENT)
Dept: PULMONOLOGY | Age: 62
End: 2022-03-28
Payer: COMMERCIAL

## 2022-03-28 VITALS
OXYGEN SATURATION: 99 % | WEIGHT: 206.9 LBS | DIASTOLIC BLOOD PRESSURE: 73 MMHG | HEART RATE: 72 BPM | TEMPERATURE: 96.5 F | HEIGHT: 63 IN | BODY MASS INDEX: 36.66 KG/M2 | RESPIRATION RATE: 16 BRPM | SYSTOLIC BLOOD PRESSURE: 119 MMHG

## 2022-03-28 DIAGNOSIS — R91.1 NODULE OF LOWER LOBE OF RIGHT LUNG: Primary | ICD-10-CM

## 2022-03-28 DIAGNOSIS — J44.9 CHRONIC OBSTRUCTIVE PULMONARY DISEASE, UNSPECIFIED COPD TYPE (HCC): ICD-10-CM

## 2022-03-28 DIAGNOSIS — F17.200 SMOKING ADDICTION: ICD-10-CM

## 2022-03-28 PROCEDURE — 99214 OFFICE O/P EST MOD 30 MIN: CPT | Performed by: NURSE PRACTITIONER

## 2022-03-28 ASSESSMENT — ENCOUNTER SYMPTOMS
SINUS PRESSURE: 0
CHEST TIGHTNESS: 0
SHORTNESS OF BREATH: 0
COUGH: 0
WHEEZING: 0
SINUS PAIN: 0
DIARRHEA: 0
NAUSEA: 0
RHINORRHEA: 0
CONSTIPATION: 0
STRIDOR: 0
VOMITING: 0

## 2022-03-28 NOTE — PATIENT INSTRUCTIONS
SURVEY:    You may be receiving a survey from [employer] regarding your visit today. Please complete the survey to enable us to provide the highest quality of care to you and your family. If you cannot score us a very good on any question, please call the office to discuss how we could have made your experience a very good one. Thank you.

## 2022-04-03 NOTE — PROCEDURES
[address]                               PULMONARY FUNCTION    PATIENT NAME: Caitlyn Trinidad                    :        1960  MED REC NO:   [id number]                              ROOM:  ACCOUNT NO:   [de-identified]                           ADMIT DATE: 2022  PROVIDER:     Dayna Ang    DATE OF PROCEDURE:  2022    FINDINGS:  The patient's spirometry is normal.  FEV1 is 88% of predicted  with 1% bronchodilator change to 89% of predicted. FVC is 92% of  predicted with no positive bronchodilator change. FEV1/FVC ratio is 74,  postbronchodilator 79. Total lung capacity by body box 99% of predicted, RV 69% of predicted,  and ERV is reduced at 27% of predicted. Diffusion capacity uncorrected  90% of predicted. Airway resistance is normal.    FINAL IMPRESSION:  Normal spirometry. There is mild decrease in  residual volume, otherwise, normal total lung capacity. Diffusion  capacity is normal.  Clinical correlation advised.         Audrey Fernandez    D: 2022 18:43:27       T: 2022 21:45:21     [email or web address]  Job#: [id number]     Doc#: [id number]    CC:  Claire Hargrove

## 2022-08-16 ENCOUNTER — TELEPHONE (OUTPATIENT)
Dept: ONCOLOGY | Age: 62
End: 2022-08-16

## 2023-01-17 ENCOUNTER — HOSPITAL ENCOUNTER (OUTPATIENT)
Age: 63
Discharge: HOME | End: 2023-01-17
Payer: COMMERCIAL

## 2023-01-17 DIAGNOSIS — I10 ESSENTIAL HYPERTENSION, BENIGN: ICD-10-CM

## 2023-01-17 DIAGNOSIS — R73.01 IMPAIRED FASTING GLUCOSE: ICD-10-CM

## 2023-01-17 DIAGNOSIS — E78.2 MIXED HYPERLIPIDEMIA: ICD-10-CM

## 2023-01-17 DIAGNOSIS — E03.9 HYPOTHYROIDISM, UNSPECIFIED TYPE: ICD-10-CM

## 2023-01-17 LAB
ALT SERPL-CCNC: 17 U/L (ref 5–33)
ANION GAP SERPL CALCULATED.3IONS-SCNC: 12 MMOL/L (ref 9–17)
AST SERPL-CCNC: 13 U/L
BUN BLDV-MCNC: 17 MG/DL (ref 8–23)
BUN/CREAT BLD: 21 (ref 9–20)
CALCIUM SERPL-MCNC: 9.3 MG/DL (ref 8.6–10.4)
CHLORIDE BLD-SCNC: 104 MMOL/L (ref 98–107)
CHOLESTEROL/HDL RATIO: 4.5
CHOLESTEROL: 241 MG/DL
CO2: 25 MMOL/L (ref 20–31)
CREAT SERPL-MCNC: 0.8 MG/DL (ref 0.5–0.9)
ESTIMATED AVERAGE GLUCOSE: 134 MG/DL
GFR SERPL CREATININE-BSD FRML MDRD: >60 ML/MIN/1.73M2
GLUCOSE BLD-MCNC: 142 MG/DL (ref 70–99)
HBA1C MFR BLD: 6.3 % (ref 4–6)
HCT VFR BLD CALC: 43 % (ref 36.3–47.1)
HDLC SERPL-MCNC: 53 MG/DL
HEMOGLOBIN: 14.6 G/DL (ref 11.9–15.1)
LDL CHOLESTEROL: 140 MG/DL (ref 0–130)
MCH RBC QN AUTO: 30.6 PG (ref 25.2–33.5)
MCHC RBC AUTO-ENTMCNC: 34 G/DL (ref 28.4–34.8)
MCV RBC AUTO: 90.1 FL (ref 82.6–102.9)
NRBC AUTOMATED: 0 PER 100 WBC
PDW BLD-RTO: 12.7 % (ref 11.8–14.4)
PLATELET # BLD: 258 K/UL (ref 138–453)
PMV BLD AUTO: 9.1 FL (ref 8.1–13.5)
POTASSIUM SERPL-SCNC: 4 MMOL/L (ref 3.7–5.3)
RBC # BLD: 4.77 M/UL (ref 3.95–5.11)
SODIUM BLD-SCNC: 141 MMOL/L (ref 135–144)
T3 FREE: 2.84 PG/ML (ref 2.02–4.43)
THYROXINE, FREE: 1.5 NG/DL (ref 0.93–1.7)
TRIGL SERPL-MCNC: 239 MG/DL
TSH SERPL DL<=0.05 MIU/L-ACNC: 1.35 UIU/ML (ref 0.3–5)
WBC # BLD: 7.8 K/UL (ref 3.5–11.3)

## 2023-01-17 PROCEDURE — 84450 TRANSFERASE (AST) (SGOT): CPT

## 2023-01-17 PROCEDURE — 84481 FREE ASSAY (FT-3): CPT

## 2023-01-17 PROCEDURE — 36415 COLL VENOUS BLD VENIPUNCTURE: CPT

## 2023-01-17 PROCEDURE — 85027 COMPLETE CBC AUTOMATED: CPT

## 2023-01-17 PROCEDURE — 84439 ASSAY OF FREE THYROXINE: CPT

## 2023-01-17 PROCEDURE — 84443 ASSAY THYROID STIM HORMONE: CPT

## 2023-01-17 PROCEDURE — 84460 ALANINE AMINO (ALT) (SGPT): CPT

## 2023-01-17 PROCEDURE — 83036 HEMOGLOBIN GLYCOSYLATED A1C: CPT

## 2023-01-17 PROCEDURE — 80048 BASIC METABOLIC PNL TOTAL CA: CPT

## 2023-01-17 PROCEDURE — 80061 LIPID PANEL: CPT

## 2023-07-17 ENCOUNTER — HOSPITAL ENCOUNTER (OUTPATIENT)
Age: 63
Discharge: HOME | End: 2023-07-17
Payer: COMMERCIAL

## 2023-07-17 DIAGNOSIS — I10 ESSENTIAL HYPERTENSION, BENIGN: ICD-10-CM

## 2023-07-17 DIAGNOSIS — R73.01 IMPAIRED FASTING GLUCOSE: ICD-10-CM

## 2023-07-17 DIAGNOSIS — E03.9 HYPOTHYROIDISM, UNSPECIFIED TYPE: ICD-10-CM

## 2023-07-17 DIAGNOSIS — E78.2 MIXED HYPERLIPIDEMIA: ICD-10-CM

## 2023-07-17 LAB
ALT SERPL-CCNC: 22 U/L (ref 5–33)
ANION GAP SERPL CALCULATED.3IONS-SCNC: 10 MMOL/L (ref 9–17)
AST SERPL-CCNC: 16 U/L
BASOPHILS # BLD: 0.08 K/UL (ref 0–0.2)
BASOPHILS NFR BLD: 1 % (ref 0–2)
BUN SERPL-MCNC: 21 MG/DL (ref 8–23)
BUN/CREAT SERPL: 23 (ref 9–20)
CALCIUM SERPL-MCNC: 9.1 MG/DL (ref 8.6–10.4)
CHLORIDE SERPL-SCNC: 104 MMOL/L (ref 98–107)
CHOLEST SERPL-MCNC: 230 MG/DL
CHOLESTEROL/HDL RATIO: 4.9
CO2 SERPL-SCNC: 27 MMOL/L (ref 20–31)
CREAT SERPL-MCNC: 0.9 MG/DL (ref 0.5–0.9)
EOSINOPHIL # BLD: 0.3 K/UL (ref 0–0.44)
EOSINOPHILS RELATIVE PERCENT: 4 % (ref 1–4)
ERYTHROCYTE [DISTWIDTH] IN BLOOD BY AUTOMATED COUNT: 13.1 % (ref 11.8–14.4)
EST. AVERAGE GLUCOSE BLD GHB EST-MCNC: 137 MG/DL
GFR SERPL CREATININE-BSD FRML MDRD: >60 ML/MIN/1.73M2
GLUCOSE SERPL-MCNC: 133 MG/DL (ref 70–99)
HBA1C MFR BLD: 6.4 % (ref 4–6)
HCT VFR BLD AUTO: 43.5 % (ref 36.3–47.1)
HDLC SERPL-MCNC: 47 MG/DL
HGB BLD-MCNC: 14.4 G/DL (ref 11.9–15.1)
IMM GRANULOCYTES # BLD AUTO: 0.03 K/UL (ref 0–0.3)
IMM GRANULOCYTES NFR BLD: 0 %
LDLC SERPL CALC-MCNC: 143 MG/DL (ref 0–130)
LYMPHOCYTES # BLD: 38 % (ref 24–43)
LYMPHOCYTES NFR BLD: 2.63 K/UL (ref 1.1–3.7)
MCH RBC QN AUTO: 29.9 PG (ref 25.2–33.5)
MCHC RBC AUTO-ENTMCNC: 33.1 G/DL (ref 28.4–34.8)
MCV RBC AUTO: 90.2 FL (ref 82.6–102.9)
MONOCYTES NFR BLD: 0.47 K/UL (ref 0.1–1.2)
MONOCYTES NFR BLD: 7 % (ref 3–12)
NEUTROPHILS NFR BLD: 50 % (ref 36–65)
NEUTS SEG NFR BLD: 3.37 K/UL (ref 1.5–8.1)
NRBC BLD-RTO: 0 PER 100 WBC
PLATELET # BLD AUTO: 272 K/UL (ref 138–453)
PMV BLD AUTO: 9.6 FL (ref 8.1–13.5)
POTASSIUM SERPL-SCNC: 3.8 MMOL/L (ref 3.7–5.3)
RBC # BLD AUTO: 4.82 M/UL (ref 3.95–5.11)
SODIUM SERPL-SCNC: 141 MMOL/L (ref 135–144)
T3FREE SERPL-MCNC: 2.56 PG/ML (ref 2.02–4.43)
T4 FREE SERPL-MCNC: 1.2 NG/DL (ref 0.9–1.7)
TRIGL SERPL-MCNC: 202 MG/DL
TSH SERPL DL<=0.05 MIU/L-ACNC: 5.89 UIU/ML (ref 0.3–5)
WBC OTHER # BLD: 6.9 K/UL (ref 3.5–11.3)

## 2023-07-17 PROCEDURE — 84439 ASSAY OF FREE THYROXINE: CPT

## 2023-07-17 PROCEDURE — 84460 ALANINE AMINO (ALT) (SGPT): CPT

## 2023-07-17 PROCEDURE — 84443 ASSAY THYROID STIM HORMONE: CPT

## 2023-07-17 PROCEDURE — 80048 BASIC METABOLIC PNL TOTAL CA: CPT

## 2023-07-17 PROCEDURE — 85027 COMPLETE CBC AUTOMATED: CPT

## 2023-07-17 PROCEDURE — 83036 HEMOGLOBIN GLYCOSYLATED A1C: CPT

## 2023-07-17 PROCEDURE — 84481 FREE ASSAY (FT-3): CPT

## 2023-07-17 PROCEDURE — 80061 LIPID PANEL: CPT

## 2023-07-17 PROCEDURE — 84450 TRANSFERASE (AST) (SGOT): CPT

## 2023-07-17 PROCEDURE — 36415 COLL VENOUS BLD VENIPUNCTURE: CPT

## 2023-09-28 ENCOUNTER — HOSPITAL ENCOUNTER (OUTPATIENT)
Dept: WOMENS IMAGING | Age: 63
Discharge: HOME | End: 2023-09-30
Payer: COMMERCIAL

## 2023-09-28 VITALS — BODY MASS INDEX: 42.52 KG/M2 | WEIGHT: 240 LBS | HEIGHT: 63 IN

## 2023-09-28 DIAGNOSIS — Z12.31 ENCOUNTER FOR SCREENING MAMMOGRAM FOR MALIGNANT NEOPLASM OF BREAST: ICD-10-CM

## 2023-09-28 PROCEDURE — 77063 BREAST TOMOSYNTHESIS BI: CPT

## 2024-01-09 ENCOUNTER — HOSPITAL ENCOUNTER (OUTPATIENT)
Age: 64
Discharge: HOME | End: 2024-01-09
Payer: COMMERCIAL

## 2024-01-09 DIAGNOSIS — R73.01 IMPAIRED FASTING GLUCOSE: ICD-10-CM

## 2024-01-09 DIAGNOSIS — E78.5 HYPERLIPIDEMIA, UNSPECIFIED HYPERLIPIDEMIA TYPE: ICD-10-CM

## 2024-01-09 DIAGNOSIS — I10 HYPERTENSION, UNSPECIFIED TYPE: ICD-10-CM

## 2024-01-09 DIAGNOSIS — E03.9 HYPOTHYROIDISM, UNSPECIFIED TYPE: ICD-10-CM

## 2024-01-09 LAB
ALT SERPL-CCNC: 18 U/L (ref 5–33)
ANION GAP SERPL CALCULATED.3IONS-SCNC: 13 MMOL/L (ref 9–17)
AST SERPL-CCNC: 15 U/L
BUN SERPL-MCNC: 18 MG/DL (ref 8–23)
BUN/CREAT SERPL: 18 (ref 9–20)
CALCIUM SERPL-MCNC: 9 MG/DL (ref 8.6–10.4)
CHLORIDE SERPL-SCNC: 100 MMOL/L (ref 98–107)
CHOLEST SERPL-MCNC: 244 MG/DL
CHOLESTEROL/HDL RATIO: 5.2
CO2 SERPL-SCNC: 26 MMOL/L (ref 20–31)
CREAT SERPL-MCNC: 1 MG/DL (ref 0.5–0.9)
ERYTHROCYTE [DISTWIDTH] IN BLOOD BY AUTOMATED COUNT: 13 % (ref 11.8–14.4)
EST. AVERAGE GLUCOSE BLD GHB EST-MCNC: 151 MG/DL
GFR SERPL CREATININE-BSD FRML MDRD: >60 ML/MIN/1.73M2
GLUCOSE SERPL-MCNC: 161 MG/DL (ref 70–99)
HBA1C MFR BLD: 6.9 % (ref 4–6)
HCT VFR BLD AUTO: 44.2 % (ref 36.3–47.1)
HDLC SERPL-MCNC: 47 MG/DL
HGB BLD-MCNC: 14.5 G/DL (ref 11.9–15.1)
LDLC SERPL CALC-MCNC: 149 MG/DL (ref 0–130)
MCH RBC QN AUTO: 30.5 PG (ref 25.2–33.5)
MCHC RBC AUTO-ENTMCNC: 32.8 G/DL (ref 28.4–34.8)
MCV RBC AUTO: 92.9 FL (ref 82.6–102.9)
NRBC BLD-RTO: 0 PER 100 WBC
PLATELET # BLD AUTO: 281 K/UL (ref 138–453)
PMV BLD AUTO: 9.8 FL (ref 8.1–13.5)
POTASSIUM SERPL-SCNC: 4.3 MMOL/L (ref 3.7–5.3)
RBC # BLD AUTO: 4.76 M/UL (ref 3.95–5.11)
SODIUM SERPL-SCNC: 139 MMOL/L (ref 135–144)
T4 FREE SERPL-MCNC: 1.1 NG/DL (ref 0.9–1.7)
TRIGL SERPL-MCNC: 242 MG/DL
TSH SERPL DL<=0.05 MIU/L-ACNC: 11.58 UIU/ML (ref 0.3–5)
WBC OTHER # BLD: 7.8 K/UL (ref 3.5–11.3)

## 2024-01-09 PROCEDURE — 85027 COMPLETE CBC AUTOMATED: CPT

## 2024-01-09 PROCEDURE — 83036 HEMOGLOBIN GLYCOSYLATED A1C: CPT

## 2024-01-09 PROCEDURE — 80048 BASIC METABOLIC PNL TOTAL CA: CPT

## 2024-01-09 PROCEDURE — 36415 COLL VENOUS BLD VENIPUNCTURE: CPT

## 2024-01-09 PROCEDURE — 84439 ASSAY OF FREE THYROXINE: CPT

## 2024-01-09 PROCEDURE — 80061 LIPID PANEL: CPT

## 2024-01-09 PROCEDURE — 84443 ASSAY THYROID STIM HORMONE: CPT

## 2024-01-09 PROCEDURE — 84481 FREE ASSAY (FT-3): CPT

## 2024-01-09 PROCEDURE — 84460 ALANINE AMINO (ALT) (SGPT): CPT

## 2024-01-09 PROCEDURE — 84450 TRANSFERASE (AST) (SGOT): CPT

## 2024-01-10 LAB — T3FREE SERPL-MCNC: 2.38 PG/ML (ref 2.02–4.43)

## 2024-02-01 ENCOUNTER — TELEPHONE (OUTPATIENT)
Dept: ONCOLOGY | Age: 64
End: 2024-02-01

## 2024-02-08 ENCOUNTER — HOSPITAL ENCOUNTER (OUTPATIENT)
Dept: CT IMAGING | Age: 64
Discharge: HOME | End: 2024-02-10
Payer: COMMERCIAL

## 2024-02-08 DIAGNOSIS — Z00.00 ROUTINE GENERAL MEDICAL EXAMINATION AT HEALTH CARE FACILITY: ICD-10-CM

## 2024-02-08 PROCEDURE — 71271 CT THORAX LUNG CANCER SCR C-: CPT

## 2024-04-06 ENCOUNTER — HOSPITAL ENCOUNTER (OUTPATIENT)
Age: 64
Discharge: HOME | End: 2024-04-06
Payer: COMMERCIAL

## 2024-04-06 DIAGNOSIS — R73.01 IMPAIRED FASTING GLUCOSE: ICD-10-CM

## 2024-04-06 DIAGNOSIS — E03.9 HYPOTHYROIDISM, UNSPECIFIED TYPE: ICD-10-CM

## 2024-04-06 DIAGNOSIS — E78.5 HYPERLIPIDEMIA, UNSPECIFIED HYPERLIPIDEMIA TYPE: ICD-10-CM

## 2024-04-06 LAB
ANION GAP SERPL CALCULATED.3IONS-SCNC: 13 MMOL/L (ref 9–17)
BUN SERPL-MCNC: 19 MG/DL (ref 8–23)
BUN/CREAT SERPL: 19 (ref 9–20)
CALCIUM SERPL-MCNC: 9.5 MG/DL (ref 8.6–10.4)
CHLORIDE SERPL-SCNC: 99 MMOL/L (ref 98–107)
CHOLEST SERPL-MCNC: 187 MG/DL (ref 0–199)
CHOLESTEROL/HDL RATIO: 4
CO2 SERPL-SCNC: 27 MMOL/L (ref 20–31)
CREAT SERPL-MCNC: 1 MG/DL (ref 0.5–0.9)
EST. AVERAGE GLUCOSE BLD GHB EST-MCNC: 148 MG/DL
GFR SERPL CREATININE-BSD FRML MDRD: 64 ML/MIN/1.73M2
GLUCOSE SERPL-MCNC: 139 MG/DL (ref 70–99)
HBA1C MFR BLD: 6.8 % (ref 4–6)
HDLC SERPL-MCNC: 48 MG/DL
LDLC SERPL CALC-MCNC: 80 MG/DL (ref 0–100)
POTASSIUM SERPL-SCNC: 4.1 MMOL/L (ref 3.7–5.3)
SODIUM SERPL-SCNC: 139 MMOL/L (ref 135–144)
T3FREE SERPL-MCNC: 2.6 PG/ML (ref 2–4.4)
T4 FREE SERPL-MCNC: 1.2 NG/DL (ref 0.92–1.68)
TRIGL SERPL-MCNC: 296 MG/DL
TSH SERPL DL<=0.05 MIU/L-ACNC: 5.31 UIU/ML (ref 0.3–5)
VLDLC SERPL CALC-MCNC: 59 MG/DL

## 2024-04-06 PROCEDURE — 83036 HEMOGLOBIN GLYCOSYLATED A1C: CPT

## 2024-04-06 PROCEDURE — 36415 COLL VENOUS BLD VENIPUNCTURE: CPT

## 2024-04-06 PROCEDURE — 84439 ASSAY OF FREE THYROXINE: CPT

## 2024-04-06 PROCEDURE — 80048 BASIC METABOLIC PNL TOTAL CA: CPT

## 2024-04-06 PROCEDURE — 84481 FREE ASSAY (FT-3): CPT

## 2024-04-06 PROCEDURE — 84443 ASSAY THYROID STIM HORMONE: CPT

## 2024-04-06 PROCEDURE — 80061 LIPID PANEL: CPT

## 2024-05-09 ENCOUNTER — HOSPITAL ENCOUNTER (OUTPATIENT)
Age: 64
Discharge: HOME | End: 2024-05-11
Payer: COMMERCIAL

## 2024-05-09 ENCOUNTER — HOSPITAL ENCOUNTER (OUTPATIENT)
Dept: GENERAL RADIOLOGY | Age: 64
Discharge: HOME | End: 2024-05-11
Payer: COMMERCIAL

## 2024-05-09 DIAGNOSIS — M25.562 LEFT KNEE PAIN, UNSPECIFIED CHRONICITY: ICD-10-CM

## 2024-05-09 PROCEDURE — 73562 X-RAY EXAM OF KNEE 3: CPT

## 2024-07-25 ENCOUNTER — HOSPITAL ENCOUNTER (OUTPATIENT)
Age: 64
Discharge: HOME | End: 2024-07-25
Payer: COMMERCIAL

## 2024-07-25 DIAGNOSIS — E03.9 HYPOTHYROIDISM, UNSPECIFIED TYPE: ICD-10-CM

## 2024-07-25 DIAGNOSIS — Z00.00 ROUTINE GENERAL MEDICAL EXAMINATION AT HEALTH CARE FACILITY: ICD-10-CM

## 2024-07-25 LAB
ALBUMIN SERPL-MCNC: 4.1 G/DL (ref 3.5–5.2)
ALBUMIN/GLOB SERPL: 1.4 {RATIO} (ref 1–2.5)
ALP SERPL-CCNC: 67 U/L (ref 35–104)
ALT SERPL-CCNC: 19 U/L (ref 5–33)
ANION GAP SERPL CALCULATED.3IONS-SCNC: 12 MMOL/L (ref 9–17)
AST SERPL-CCNC: 15 U/L
BILIRUB SERPL-MCNC: 0.3 MG/DL (ref 0.3–1.2)
BUN SERPL-MCNC: 18 MG/DL (ref 8–23)
BUN/CREAT SERPL: 20 (ref 9–20)
CALCIUM SERPL-MCNC: 9.4 MG/DL (ref 8.6–10.4)
CHLORIDE SERPL-SCNC: 99 MMOL/L (ref 98–107)
CO2 SERPL-SCNC: 27 MMOL/L (ref 20–31)
CREAT SERPL-MCNC: 0.9 MG/DL (ref 0.5–0.9)
ERYTHROCYTE [DISTWIDTH] IN BLOOD BY AUTOMATED COUNT: 13.2 % (ref 11.8–14.4)
EST. AVERAGE GLUCOSE BLD GHB EST-MCNC: 140 MG/DL
GFR, ESTIMATED: 72 ML/MIN/1.73M2
GLUCOSE SERPL-MCNC: 120 MG/DL (ref 70–99)
HBA1C MFR BLD: 6.5 % (ref 4–6)
HCT VFR BLD AUTO: 39.4 % (ref 36.3–47.1)
HGB BLD-MCNC: 13.3 G/DL (ref 11.9–15.1)
MCH RBC QN AUTO: 30.2 PG (ref 25.2–33.5)
MCHC RBC AUTO-ENTMCNC: 33.8 G/DL (ref 28.4–34.8)
MCV RBC AUTO: 89.3 FL (ref 82.6–102.9)
NRBC BLD-RTO: 0 PER 100 WBC
PLATELET # BLD AUTO: 267 K/UL (ref 138–453)
PMV BLD AUTO: 9.2 FL (ref 8.1–13.5)
POTASSIUM SERPL-SCNC: 3.8 MMOL/L (ref 3.7–5.3)
PROT SERPL-MCNC: 7 G/DL (ref 6.4–8.3)
RBC # BLD AUTO: 4.41 M/UL (ref 3.95–5.11)
SODIUM SERPL-SCNC: 138 MMOL/L (ref 135–144)
T3FREE SERPL-MCNC: 2.8 PG/ML (ref 2–4.4)
TSH SERPL DL<=0.05 MIU/L-ACNC: 5.08 UIU/ML (ref 0.3–5)
WBC OTHER # BLD: 7.9 K/UL (ref 3.5–11.3)

## 2024-07-25 PROCEDURE — 84439 ASSAY OF FREE THYROXINE: CPT

## 2024-07-25 PROCEDURE — 80053 COMPREHEN METABOLIC PANEL: CPT

## 2024-07-25 PROCEDURE — 84443 ASSAY THYROID STIM HORMONE: CPT

## 2024-07-25 PROCEDURE — 84481 FREE ASSAY (FT-3): CPT

## 2024-07-25 PROCEDURE — 85027 COMPLETE CBC AUTOMATED: CPT

## 2024-07-25 PROCEDURE — 80061 LIPID PANEL: CPT

## 2024-07-25 PROCEDURE — 83036 HEMOGLOBIN GLYCOSYLATED A1C: CPT

## 2024-07-25 PROCEDURE — 36415 COLL VENOUS BLD VENIPUNCTURE: CPT

## 2024-07-26 LAB
CHOLEST SERPL-MCNC: 122 MG/DL (ref 0–199)
CHOLESTEROL/HDL RATIO: 2
HDLC SERPL-MCNC: 53 MG/DL
LDLC SERPL CALC-MCNC: 22 MG/DL (ref 0–100)
T4 FREE SERPL-MCNC: 1.3 NG/DL (ref 0.92–1.68)
TRIGL SERPL-MCNC: 239 MG/DL
VLDLC SERPL CALC-MCNC: 48 MG/DL

## 2024-10-15 ENCOUNTER — HOSPITAL ENCOUNTER (OUTPATIENT)
Age: 64
Discharge: HOME | End: 2024-10-15
Payer: COMMERCIAL

## 2024-10-15 DIAGNOSIS — E03.9 HYPOTHYROIDISM, UNSPECIFIED TYPE: ICD-10-CM

## 2024-10-15 LAB
T3FREE SERPL-MCNC: 2.5 PG/ML (ref 2–4.4)
T4 FREE SERPL-MCNC: 1 NG/DL (ref 0.92–1.68)
TSH SERPL DL<=0.05 MIU/L-ACNC: 7.95 UIU/ML (ref 0.27–4.2)

## 2024-10-15 PROCEDURE — 84439 ASSAY OF FREE THYROXINE: CPT

## 2024-10-15 PROCEDURE — 36415 COLL VENOUS BLD VENIPUNCTURE: CPT

## 2024-10-15 PROCEDURE — 84481 FREE ASSAY (FT-3): CPT

## 2024-10-15 PROCEDURE — 84443 ASSAY THYROID STIM HORMONE: CPT

## 2024-11-15 ENCOUNTER — HOSPITAL ENCOUNTER (OUTPATIENT)
Age: 64
Discharge: HOME | End: 2024-11-15
Payer: COMMERCIAL

## 2024-11-15 DIAGNOSIS — E03.9 HYPOTHYROIDISM, UNSPECIFIED TYPE: ICD-10-CM

## 2024-11-15 LAB
T4 FREE SERPL-MCNC: 1.3 NG/DL (ref 0.92–1.68)
TSH SERPL DL<=0.05 MIU/L-ACNC: 5.84 UIU/ML (ref 0.27–4.2)

## 2024-11-15 PROCEDURE — 84439 ASSAY OF FREE THYROXINE: CPT

## 2024-11-15 PROCEDURE — 84443 ASSAY THYROID STIM HORMONE: CPT

## 2024-11-15 PROCEDURE — 36415 COLL VENOUS BLD VENIPUNCTURE: CPT

## 2025-01-23 ENCOUNTER — HOSPITAL ENCOUNTER (OUTPATIENT)
Age: 65
Discharge: HOME | End: 2025-01-23
Payer: MEDICAID

## 2025-01-23 DIAGNOSIS — E78.5 HYPERLIPIDEMIA, UNSPECIFIED HYPERLIPIDEMIA TYPE: ICD-10-CM

## 2025-01-23 DIAGNOSIS — E11.65 TYPE 2 DIABETES MELLITUS WITH HYPERGLYCEMIA, WITHOUT LONG-TERM CURRENT USE OF INSULIN (HCC): ICD-10-CM

## 2025-01-23 DIAGNOSIS — E03.9 HYPOTHYROIDISM, UNSPECIFIED TYPE: ICD-10-CM

## 2025-01-23 LAB
ALBUMIN SERPL-MCNC: 4 G/DL (ref 3.5–5.2)
ALBUMIN/GLOB SERPL: 1.5 {RATIO} (ref 1–2.5)
ALP SERPL-CCNC: 68 U/L (ref 35–104)
ALT SERPL-CCNC: 22 U/L (ref 10–35)
ANION GAP SERPL CALCULATED.3IONS-SCNC: 11 MMOL/L (ref 9–16)
AST SERPL-CCNC: 20 U/L (ref 10–35)
BILIRUB SERPL-MCNC: 0.2 MG/DL (ref 0–1.2)
BUN SERPL-MCNC: 15 MG/DL (ref 8–23)
BUN/CREAT SERPL: 19 (ref 9–20)
CALCIUM SERPL-MCNC: 8.9 MG/DL (ref 8.6–10.4)
CHLORIDE SERPL-SCNC: 103 MMOL/L (ref 98–107)
CHOLEST SERPL-MCNC: 126 MG/DL (ref 0–199)
CHOLESTEROL/HDL RATIO: 2.5
CO2 SERPL-SCNC: 26 MMOL/L (ref 20–31)
CREAT SERPL-MCNC: 0.8 MG/DL (ref 0.5–0.9)
ERYTHROCYTE [DISTWIDTH] IN BLOOD BY AUTOMATED COUNT: 13.1 % (ref 11.8–14.4)
EST. AVERAGE GLUCOSE BLD GHB EST-MCNC: 148 MG/DL
GFR, ESTIMATED: 79 ML/MIN/1.73M2
GLUCOSE SERPL-MCNC: 144 MG/DL (ref 74–99)
HBA1C MFR BLD: 6.8 % (ref 4–6)
HCT VFR BLD AUTO: 40 % (ref 36.3–47.1)
HDLC SERPL-MCNC: 50 MG/DL
HGB BLD-MCNC: 13.1 G/DL (ref 11.9–15.1)
LDLC SERPL CALC-MCNC: 33 MG/DL (ref 0–100)
MCH RBC QN AUTO: 29.8 PG (ref 25.2–33.5)
MCHC RBC AUTO-ENTMCNC: 32.8 G/DL (ref 28.4–34.8)
MCV RBC AUTO: 90.9 FL (ref 82.6–102.9)
NRBC BLD-RTO: 0 PER 100 WBC
PLATELET # BLD AUTO: 257 K/UL (ref 138–453)
PMV BLD AUTO: 9.1 FL (ref 8.1–13.5)
POTASSIUM SERPL-SCNC: 4.2 MMOL/L (ref 3.7–5.3)
PROT SERPL-MCNC: 6.7 G/DL (ref 6.6–8.7)
RBC # BLD AUTO: 4.4 M/UL (ref 3.95–5.11)
SODIUM SERPL-SCNC: 140 MMOL/L (ref 136–145)
T3FREE SERPL-MCNC: 2.76 PG/ML (ref 2–4.4)
T4 FREE SERPL-MCNC: 1.1 NG/DL (ref 0.9–1.7)
TRIGL SERPL-MCNC: 214 MG/DL
TSH SERPL DL<=0.05 MIU/L-ACNC: 6.03 UIU/ML (ref 0.27–4.2)
VLDLC SERPL CALC-MCNC: 43 MG/DL (ref 1–30)
WBC OTHER # BLD: 7.3 K/UL (ref 3.5–11.3)

## 2025-01-23 PROCEDURE — 83036 HEMOGLOBIN GLYCOSYLATED A1C: CPT

## 2025-01-23 PROCEDURE — 36415 COLL VENOUS BLD VENIPUNCTURE: CPT

## 2025-01-23 PROCEDURE — 80061 LIPID PANEL: CPT

## 2025-01-23 PROCEDURE — 84443 ASSAY THYROID STIM HORMONE: CPT

## 2025-01-23 PROCEDURE — 80053 COMPREHEN METABOLIC PANEL: CPT

## 2025-01-23 PROCEDURE — 85027 COMPLETE CBC AUTOMATED: CPT

## 2025-01-23 PROCEDURE — 84481 FREE ASSAY (FT-3): CPT

## 2025-01-23 PROCEDURE — 84439 ASSAY OF FREE THYROXINE: CPT

## 2025-03-13 ENCOUNTER — HOSPITAL ENCOUNTER (OUTPATIENT)
Age: 65
Discharge: HOME | End: 2025-03-13
Payer: MEDICAID

## 2025-03-13 DIAGNOSIS — E03.9 HYPOTHYROIDISM (ACQUIRED): ICD-10-CM

## 2025-03-13 LAB
T3FREE SERPL-MCNC: 3.19 PG/ML (ref 2–4.4)
T4 FREE SERPL-MCNC: 1.6 NG/DL (ref 0.9–1.7)
TSH SERPL DL<=0.05 MIU/L-ACNC: 1.76 UIU/ML (ref 0.27–4.2)

## 2025-03-13 PROCEDURE — 84439 ASSAY OF FREE THYROXINE: CPT

## 2025-03-13 PROCEDURE — 84443 ASSAY THYROID STIM HORMONE: CPT

## 2025-03-13 PROCEDURE — 84481 FREE ASSAY (FT-3): CPT

## 2025-03-13 PROCEDURE — 36415 COLL VENOUS BLD VENIPUNCTURE: CPT

## 2025-03-28 ENCOUNTER — HOSPITAL ENCOUNTER (OUTPATIENT)
Dept: WOMENS IMAGING | Age: 65
Discharge: HOME | End: 2025-03-30
Payer: MEDICAID

## 2025-03-28 DIAGNOSIS — Z12.31 ENCOUNTER FOR SCREENING MAMMOGRAM FOR MALIGNANT NEOPLASM OF BREAST: ICD-10-CM

## 2025-03-28 PROCEDURE — 77063 BREAST TOMOSYNTHESIS BI: CPT

## 2025-04-04 ENCOUNTER — HOSPITAL ENCOUNTER (OUTPATIENT)
Dept: CT IMAGING | Age: 65
Discharge: HOME | End: 2025-04-04
Payer: MEDICAID

## 2025-04-04 DIAGNOSIS — Z00.00 ROUTINE GENERAL MEDICAL EXAMINATION AT HEALTH CARE FACILITY: ICD-10-CM

## 2025-04-04 PROCEDURE — 71271 CT THORAX LUNG CANCER SCR C-: CPT

## 2025-07-24 ENCOUNTER — HOSPITAL ENCOUNTER (OUTPATIENT)
Age: 65
Discharge: HOME | End: 2025-07-24
Payer: MEDICAID

## 2025-07-24 DIAGNOSIS — E11.65 TYPE 2 DIABETES MELLITUS WITH HYPERGLYCEMIA, WITHOUT LONG-TERM CURRENT USE OF INSULIN (HCC): ICD-10-CM

## 2025-07-24 DIAGNOSIS — E78.5 HYPERLIPIDEMIA, UNSPECIFIED HYPERLIPIDEMIA TYPE: ICD-10-CM

## 2025-07-24 DIAGNOSIS — I10 HYPERTENSION, UNSPECIFIED TYPE: ICD-10-CM

## 2025-07-24 DIAGNOSIS — E03.9 HYPOTHYROIDISM (ACQUIRED): ICD-10-CM

## 2025-07-24 LAB
ALBUMIN SERPL-MCNC: 4 G/DL (ref 3.5–5.2)
ALBUMIN/GLOB SERPL: 1.4 {RATIO} (ref 1–2.5)
ALP SERPL-CCNC: 71 U/L (ref 35–104)
ALT SERPL-CCNC: 22 U/L (ref 10–35)
ANION GAP SERPL CALCULATED.3IONS-SCNC: 14 MMOL/L (ref 9–16)
AST SERPL-CCNC: 19 U/L (ref 10–35)
BILIRUB SERPL-MCNC: 0.2 MG/DL (ref 0–1.2)
BUN SERPL-MCNC: 19 MG/DL (ref 8–23)
BUN/CREAT SERPL: 21 (ref 9–20)
CALCIUM SERPL-MCNC: 8.9 MG/DL (ref 8.6–10.4)
CHLORIDE SERPL-SCNC: 101 MMOL/L (ref 98–107)
CHOLEST SERPL-MCNC: 131 MG/DL (ref 0–199)
CHOLESTEROL/HDL RATIO: 2.8
CO2 SERPL-SCNC: 24 MMOL/L (ref 20–31)
CREAT SERPL-MCNC: 0.9 MG/DL (ref 0.5–0.9)
ERYTHROCYTE [DISTWIDTH] IN BLOOD BY AUTOMATED COUNT: 13.3 % (ref 11.8–14.4)
EST. AVERAGE GLUCOSE BLD GHB EST-MCNC: 146 MG/DL
GFR, ESTIMATED: 72 ML/MIN/1.73M2
GLUCOSE SERPL-MCNC: 121 MG/DL (ref 74–99)
HBA1C MFR BLD: 6.7 % (ref 4–6)
HCT VFR BLD AUTO: 41.1 % (ref 36.3–47.1)
HDLC SERPL-MCNC: 47 MG/DL
HGB BLD-MCNC: 13.3 G/DL (ref 11.9–15.1)
LDLC SERPL CALC-MCNC: 44 MG/DL (ref 0–100)
MCH RBC QN AUTO: 29.2 PG (ref 25.2–33.5)
MCHC RBC AUTO-ENTMCNC: 32.4 G/DL (ref 28.4–34.8)
MCV RBC AUTO: 90.1 FL (ref 82.6–102.9)
NRBC BLD-RTO: 0 PER 100 WBC
PLATELET # BLD AUTO: 290 K/UL (ref 138–453)
PMV BLD AUTO: 9.1 FL (ref 8.1–13.5)
POTASSIUM SERPL-SCNC: 3.9 MMOL/L (ref 3.7–5.3)
PROT SERPL-MCNC: 6.9 G/DL (ref 6.6–8.7)
RBC # BLD AUTO: 4.56 M/UL (ref 3.95–5.11)
SODIUM SERPL-SCNC: 139 MMOL/L (ref 136–145)
T3FREE SERPL-MCNC: 2.72 PG/ML (ref 2–4.4)
T4 FREE SERPL-MCNC: 1.3 NG/DL (ref 0.92–1.68)
TRIGL SERPL-MCNC: 199 MG/DL
TSH SERPL DL<=0.05 MIU/L-ACNC: 1.41 UIU/ML (ref 0.27–4.2)
VLDLC SERPL CALC-MCNC: 40 MG/DL (ref 1–30)
WBC OTHER # BLD: 7.2 K/UL (ref 3.5–11.3)

## 2025-07-24 PROCEDURE — 84481 FREE ASSAY (FT-3): CPT

## 2025-07-24 PROCEDURE — 84439 ASSAY OF FREE THYROXINE: CPT

## 2025-07-24 PROCEDURE — 83036 HEMOGLOBIN GLYCOSYLATED A1C: CPT

## 2025-07-24 PROCEDURE — 80053 COMPREHEN METABOLIC PANEL: CPT

## 2025-07-24 PROCEDURE — 80061 LIPID PANEL: CPT

## 2025-07-24 PROCEDURE — 85027 COMPLETE CBC AUTOMATED: CPT

## 2025-07-24 PROCEDURE — 36415 COLL VENOUS BLD VENIPUNCTURE: CPT

## 2025-07-24 PROCEDURE — 84443 ASSAY THYROID STIM HORMONE: CPT

## 2025-08-04 ENCOUNTER — HOSPITAL ENCOUNTER (OUTPATIENT)
Dept: GENERAL RADIOLOGY | Age: 65
Discharge: HOME | End: 2025-08-06
Payer: MEDICAID

## 2025-08-04 DIAGNOSIS — M54.16 LUMBAR BACK PAIN WITH RADICULOPATHY AFFECTING LEFT LOWER EXTREMITY: ICD-10-CM

## 2025-08-04 PROCEDURE — 72100 X-RAY EXAM L-S SPINE 2/3 VWS: CPT

## 2025-08-29 ENCOUNTER — HOSPITAL ENCOUNTER (OUTPATIENT)
Dept: LAB | Age: 65
Discharge: HOME | End: 2025-08-29
Payer: MEDICAID

## 2025-08-29 DIAGNOSIS — M25.40 JOINT SWELLING: ICD-10-CM

## 2025-08-29 DIAGNOSIS — Z13.828 SCREENING FOR RHEUMATOID ARTHRITIS: ICD-10-CM

## 2025-08-29 DIAGNOSIS — Z82.61 FAMILY HISTORY OF RHEUMATOID ARTHRITIS: ICD-10-CM

## 2025-08-29 LAB
CRP SERPL HS-MCNC: <3 MG/L (ref 0–5)
ERYTHROCYTE [DISTWIDTH] IN BLOOD BY AUTOMATED COUNT: 13.4 % (ref 11.8–14.4)
ERYTHROCYTE [SEDIMENTATION RATE] IN BLOOD BY PHOTOMETRIC METHOD: 3 MM/HR (ref 0–30)
HCT VFR BLD AUTO: 39.9 % (ref 36.3–47.1)
HGB BLD-MCNC: 12.9 G/DL (ref 11.9–15.1)
MCH RBC QN AUTO: 29.7 PG (ref 25.2–33.5)
MCHC RBC AUTO-ENTMCNC: 32.3 G/DL (ref 28.4–34.8)
MCV RBC AUTO: 91.7 FL (ref 82.6–102.9)
NRBC BLD-RTO: 0 PER 100 WBC
PLATELET # BLD AUTO: 288 K/UL (ref 138–453)
PMV BLD AUTO: 9.5 FL (ref 8.1–13.5)
RBC # BLD AUTO: 4.35 M/UL (ref 3.95–5.11)
RHEUMATOID FACT SER NEPH-ACNC: <10 IU/ML (ref 0–13)
URATE SERPL-MCNC: 6.7 MG/DL (ref 2.4–5.7)
WBC OTHER # BLD: 6.8 K/UL (ref 3.5–11.3)

## 2025-08-29 PROCEDURE — 86140 C-REACTIVE PROTEIN: CPT

## 2025-08-29 PROCEDURE — 85027 COMPLETE CBC AUTOMATED: CPT

## 2025-08-29 PROCEDURE — 84550 ASSAY OF BLOOD/URIC ACID: CPT

## 2025-08-29 PROCEDURE — 36415 COLL VENOUS BLD VENIPUNCTURE: CPT

## 2025-08-29 PROCEDURE — 86200 CCP ANTIBODY: CPT

## 2025-08-29 PROCEDURE — 85652 RBC SED RATE AUTOMATED: CPT

## 2025-08-29 PROCEDURE — 86431 RHEUMATOID FACTOR QUANT: CPT

## 2025-09-01 LAB — CCP AB SER IA-ACNC: 1.3 U/ML (ref 0–7)
